# Patient Record
Sex: MALE | Race: ASIAN | NOT HISPANIC OR LATINO
[De-identification: names, ages, dates, MRNs, and addresses within clinical notes are randomized per-mention and may not be internally consistent; named-entity substitution may affect disease eponyms.]

---

## 2020-03-28 ENCOUNTER — TRANSCRIPTION ENCOUNTER (OUTPATIENT)
Age: 40
End: 2020-03-28

## 2020-09-04 ENCOUNTER — TRANSCRIPTION ENCOUNTER (OUTPATIENT)
Age: 40
End: 2020-09-04

## 2020-10-10 ENCOUNTER — EMERGENCY (EMERGENCY)
Facility: HOSPITAL | Age: 40
LOS: 0 days | Discharge: HOME | End: 2020-10-10
Payer: COMMERCIAL

## 2020-10-10 VITALS
RESPIRATION RATE: 17 BRPM | TEMPERATURE: 99 F | SYSTOLIC BLOOD PRESSURE: 140 MMHG | HEART RATE: 69 BPM | DIASTOLIC BLOOD PRESSURE: 95 MMHG | OXYGEN SATURATION: 100 %

## 2020-10-10 DIAGNOSIS — Z20.828 CONTACT WITH AND (SUSPECTED) EXPOSURE TO OTHER VIRAL COMMUNICABLE DISEASES: ICD-10-CM

## 2020-10-10 LAB — SARS-COV-2 RNA SPEC QL NAA+PROBE: SIGNIFICANT CHANGE UP

## 2020-10-10 PROCEDURE — U0003: CPT

## 2020-10-10 PROCEDURE — 99283 EMERGENCY DEPT VISIT LOW MDM: CPT

## 2020-10-10 NOTE — ED ADULT NURSE NOTE - OBJECTIVE STATEMENT
Patient evaluated, treated, and discharged by provider. Refer to provider note for assessment.  Patient presents for COVID swabbing. Evaluated in car by assigned RN and PA.

## 2020-10-10 NOTE — ED STATDOCS - NS ED ROS FT
ROS: Constitutional- DENIES fever, chills.  Respiratory- DENIES cough, SOB  Cardiac- no chest pain, no palpitations, ENT- DENIES rhinorrhea, no sore throat, no congestion. DENIES loss of sense of smell or taste. Abdomen- No nausea, no vomiting, no diarrhea.  Urinary- no dysuria, no urgency, no frequency.  Skin- No rashes ~Dick Arshad PA-C

## 2020-10-10 NOTE — ED STATDOCS - PROGRESS NOTE DETAILS
How to get your Coronavirus (COVID-19) Testing Results:   Please be advised that you were tested for the coronavirus (COVID-19) in the Emergency Department at [facility].  You are to maintain self-quarantine procedures for 14 days until instructed otherwise by one of our healthcare agents. Please note that the test may take up to 2-4 days to result.  If you do not hear from us within 72 hours and you'd like to check on your results, you can call on of our coronavirus specialists at [address] [place] [address] (available 24/7).  Please DO NOT call the site where you received the test to obtain your results. ~Dick Arshad PA-C

## 2020-10-10 NOTE — ED STATDOCS - CLINICAL SUMMARY MEDICAL DECISION MAKING FREE TEXT BOX
Patient presents with concerns for COVID exposure.  As patient is nontoxic appearing, will test for COVID and d/c.  Quarantine reviewed and return precautions reviewed. ~Dick Arshad PA-C

## 2020-10-10 NOTE — ED STATDOCS - PHYSICAL EXAMINATION
PA note: Constitutional: NAD AAOx3  Eyes: EOMI, pupils equal  Head: Normocephalic, atraumatic  ENT: Throat is clear without erythema. No exudates. Airway is patent.  Mouth: no airway obstruction.   Cardiac: S1 S2. regular rate   Resp: Non-labored breathing, no tachypnea. Lungs CTA b/l. No w/r/r. Equal chest expansion and rise. O2sat 100% RA  GI: Abd soft. NT/ND.   Neuro: CN2-12 intact. No focal deficits.   Skin: No rashes  ~Dick Arshad PA-C

## 2020-10-10 NOTE — ED STATDOCS - PATIENT PORTAL LINK FT
You can access the FollowMyHealth Patient Portal offered by [facility] by registering at the following website: [email or web address]. By joining [facility]’s FollowMyHealth portal, you will also be able to view your health information using other applications (apps) compatible with our system.

## 2020-10-10 NOTE — ED ADULT TRIAGE NOTE - CHIEF COMPLAINT QUOTE
Patient presents for COVID-19 testing. Patient provides verbal consent to receive results via text or email  no symptoms, needs for emergency flight tonight 2* mother passing

## 2020-10-10 NOTE — ED STATDOCS - OBJECTIVE STATEMENT
Patient presents to [facility] for screening for COVID-19 for traveling purposes. Patient's mother passed away last night due to a "heart condition". Patient is taking his mother's body to [place] for burial and needs a NEG COVID test in order to travel. Patient is flying out tonight and requests an URGENT COVID test. Patient has no acute S&S of fever, chills, cough, SOB, ISBELL, n/v/d. No loss of sense of smell or taste. Patient may have been exposed to COVID-19. ~Dick Arshad PA-C.

## 2020-10-30 ENCOUNTER — TRANSCRIPTION ENCOUNTER (OUTPATIENT)
Age: 40
End: 2020-10-30

## 2020-11-25 ENCOUNTER — OUTPATIENT (OUTPATIENT)
Dept: OUTPATIENT SERVICES | Facility: HOSPITAL | Age: 40
LOS: 1 days | End: 2020-11-25
Payer: COMMERCIAL

## 2020-11-25 DIAGNOSIS — Z11.59 ENCOUNTER FOR SCREENING FOR OTHER VIRAL DISEASES: ICD-10-CM

## 2020-11-25 PROBLEM — Z78.9 OTHER SPECIFIED HEALTH STATUS: Chronic | Status: ACTIVE | Noted: 2020-10-10

## 2020-11-25 LAB — SARS-COV-2 RNA SPEC QL NAA+PROBE: SIGNIFICANT CHANGE UP

## 2020-11-25 PROCEDURE — U0003: CPT

## 2020-11-26 DIAGNOSIS — Z11.59 ENCOUNTER FOR SCREENING FOR OTHER VIRAL DISEASES: ICD-10-CM

## 2021-04-06 ENCOUNTER — TRANSCRIPTION ENCOUNTER (OUTPATIENT)
Age: 41
End: 2021-04-06

## 2021-04-13 ENCOUNTER — TRANSCRIPTION ENCOUNTER (OUTPATIENT)
Age: 41
End: 2021-04-13

## 2022-02-26 ENCOUNTER — TRANSCRIPTION ENCOUNTER (OUTPATIENT)
Age: 42
End: 2022-02-26

## 2022-10-28 ENCOUNTER — NON-APPOINTMENT (OUTPATIENT)
Age: 42
End: 2022-10-28

## 2022-11-05 ENCOUNTER — NON-APPOINTMENT (OUTPATIENT)
Age: 42
End: 2022-11-05

## 2023-10-06 ENCOUNTER — NON-APPOINTMENT (OUTPATIENT)
Age: 43
End: 2023-10-06

## 2023-10-17 PROBLEM — Z00.00 ENCOUNTER FOR PREVENTIVE HEALTH EXAMINATION: Status: ACTIVE | Noted: 2023-10-17

## 2023-11-30 ENCOUNTER — NON-APPOINTMENT (OUTPATIENT)
Age: 43
End: 2023-11-30

## 2023-11-30 ENCOUNTER — APPOINTMENT (OUTPATIENT)
Dept: DERMATOLOGY | Facility: CLINIC | Age: 43
End: 2023-11-30
Payer: COMMERCIAL

## 2023-11-30 PROCEDURE — 17110 DESTRUCTION B9 LES UP TO 14: CPT

## 2023-11-30 PROCEDURE — 99204 OFFICE O/P NEW MOD 45 MIN: CPT | Mod: 25

## 2023-11-30 RX ORDER — KETOCONAZOLE 20 MG/G
2 CREAM TOPICAL
Qty: 60 | Refills: 2 | Status: ACTIVE | COMMUNITY
Start: 2023-11-30 | End: 1900-01-01

## 2023-12-25 ENCOUNTER — NON-APPOINTMENT (OUTPATIENT)
Age: 43
End: 2023-12-25

## 2024-01-19 ENCOUNTER — APPOINTMENT (OUTPATIENT)
Dept: DERMATOLOGY | Facility: CLINIC | Age: 44
End: 2024-01-19
Payer: COMMERCIAL

## 2024-01-19 VITALS — WEIGHT: 262 LBS | BODY MASS INDEX: 36.68 KG/M2 | HEIGHT: 71 IN

## 2024-01-19 DIAGNOSIS — L30.9 DERMATITIS, UNSPECIFIED: ICD-10-CM

## 2024-01-19 DIAGNOSIS — B35.3 TINEA PEDIS: ICD-10-CM

## 2024-01-19 DIAGNOSIS — B35.1 TINEA UNGUIUM: ICD-10-CM

## 2024-01-19 DIAGNOSIS — B07.9 VIRAL WART, UNSPECIFIED: ICD-10-CM

## 2024-01-19 PROCEDURE — 99214 OFFICE O/P EST MOD 30 MIN: CPT | Mod: 25

## 2024-01-19 PROCEDURE — 17110 DESTRUCTION B9 LES UP TO 14: CPT

## 2024-01-19 RX ORDER — CICLOPIROX 80 MG/ML
8 SOLUTION TOPICAL
Qty: 1 | Refills: 5 | Status: ACTIVE | COMMUNITY
Start: 2023-11-30 | End: 1900-01-01

## 2024-01-19 RX ORDER — HYDROCORTISONE 25 MG/G
2.5 OINTMENT TOPICAL
Qty: 1 | Refills: 0 | Status: ACTIVE | COMMUNITY
Start: 2024-01-19 | End: 1900-01-01

## 2024-01-19 NOTE — HISTORY OF PRESENT ILLNESS
[FreeTextEntry1] : Tinea pedis/onycho f/u [de-identified] : Patient here for follow-up of tinea pedis, onychomycosis and verruca. Patient has been using keto cream to feet with improvement but was unable to get ciclopirox solution. Verruca on neck treated with cryotherapy with no improvement.  Patient also c/o dryness under his left eye. He denies any new personal care products.

## 2024-01-19 NOTE — PHYSICAL EXAM
[Alert] : alert [Oriented x 3] : ~L oriented x 3 [Well Nourished] : well nourished [FreeTextEntry3] : verrucous and flat-topped flesh-colored to pink papules on neck  mild scaling of bilateral feet  thickened discolored toenails   Mild flaking of left lower eyelid

## 2024-01-19 NOTE — ASSESSMENT
[FreeTextEntry1] : Tinea pedis: Continue Ketoconazole cream BID until resolved   Onychomycosis: Start Ciclopirox solution BID to affected toenails (sent to [place]) Discussed that nails can co-infect feet   Eczema/eyelid dermatitis: Hydrocortisone 2.5% ointment BID x 1 week Discussed risks and benefits of topical steroids, including atrophy, telangiectasias, striae, acneiform eruption, purpura, changes in pigmentation, and hypertrichosis.   Verruca vulgaris, on the neck We have discussed the nature and course of this condition. We have discussed treatment options and expectations from treatment Electrocautery to verruca x 9   Procedure was well tolerated without complication. Discussed wound care with vaseline. Advised patient to avoid shaving area until lesions resolve

## 2024-04-18 ENCOUNTER — APPOINTMENT (OUTPATIENT)
Dept: DERMATOLOGY | Facility: CLINIC | Age: 44
End: 2024-04-18

## 2024-05-15 ENCOUNTER — NON-APPOINTMENT (OUTPATIENT)
Age: 44
End: 2024-05-15

## 2024-08-24 ENCOUNTER — NON-APPOINTMENT (OUTPATIENT)
Age: 44
End: 2024-08-24

## 2024-09-19 ENCOUNTER — APPOINTMENT (OUTPATIENT)
Dept: OTOLARYNGOLOGY | Facility: CLINIC | Age: 44
End: 2024-09-19
Payer: COMMERCIAL

## 2024-09-19 VITALS
DIASTOLIC BLOOD PRESSURE: 85 MMHG | BODY MASS INDEX: 23.8 KG/M2 | WEIGHT: 170 LBS | SYSTOLIC BLOOD PRESSURE: 141 MMHG | HEIGHT: 71 IN | HEART RATE: 71 BPM

## 2024-09-19 DIAGNOSIS — Z86.39 PERSONAL HISTORY OF OTHER ENDOCRINE, NUTRITIONAL AND METABOLIC DISEASE: ICD-10-CM

## 2024-09-19 DIAGNOSIS — H90.3 SENSORINEURAL HEARING LOSS, BILATERAL: ICD-10-CM

## 2024-09-19 DIAGNOSIS — H61.23 IMPACTED CERUMEN, BILATERAL: ICD-10-CM

## 2024-09-19 DIAGNOSIS — Z78.9 OTHER SPECIFIED HEALTH STATUS: ICD-10-CM

## 2024-09-19 DIAGNOSIS — H69.91 UNSPECIFIED EUSTACHIAN TUBE DISORDER, RIGHT EAR: ICD-10-CM

## 2024-09-19 DIAGNOSIS — H69.92 UNSPECIFIED EUSTACHIAN TUBE DISORDER, LEFT EAR: ICD-10-CM

## 2024-09-19 DIAGNOSIS — Z86.69 PERSONAL HISTORY OF OTHER DISEASES OF THE NERVOUS SYSTEM AND SENSE ORGANS: ICD-10-CM

## 2024-09-19 PROCEDURE — G0268 REMOVAL OF IMPACTED WAX MD: CPT

## 2024-09-19 PROCEDURE — 92557 COMPREHENSIVE HEARING TEST: CPT

## 2024-09-19 PROCEDURE — 99204 OFFICE O/P NEW MOD 45 MIN: CPT | Mod: 25

## 2024-09-19 PROCEDURE — 92567 TYMPANOMETRY: CPT

## 2024-09-19 RX ORDER — FLUTICASONE PROPIONATE 50 UG/1
50 SPRAY, METERED NASAL DAILY
Qty: 1 | Refills: 2 | Status: ACTIVE | COMMUNITY
Start: 2024-09-19 | End: 1900-01-01

## 2024-09-19 RX ORDER — LEVOTHYROXINE SODIUM 0.17 MG/1
TABLET ORAL
Refills: 0 | Status: ACTIVE | COMMUNITY

## 2024-09-19 NOTE — PHYSICAL EXAM
[Midline] : trachea located in midline position [Normal] : temporomandibular joint is normal [de-identified] : bilat cerumen  [de-identified] : after removal

## 2024-09-19 NOTE — HISTORY OF PRESENT ILLNESS
[de-identified] : 44 year old male presents for right ruptured ear drum for past 1 month when patient was landing on flight- he started to feel pain.  Told of perf at Urgent Care.  Urgent care visit 8/25/24- started Ofloxacin: mild relief. Now, intermittent otalgia. Referred to ENT. PCP stated left ear clogging. Patient denies otorrhea,  hearing loss, tinnitus, dizziness, vertigo related to hearing.  Still occ discomfort right ear.   No prior ear problems

## 2024-09-19 NOTE — ASSESSMENT
[FreeTextEntry1] : Patient with right ear discomfort after airplane flight one month ago.  Treated at  with ear drops and told of perf.  No hx of drainage. Now improved.  Exam today - NO perforation noted.  Ears clear and audio and tymp normal.  Feel likely ETD - recommended flonase and followup as needed.  Feel likely either healed perf or more likely no perforation and ETD after flying.  Cerumen was removed from both ears.

## 2025-01-25 ENCOUNTER — RX RENEWAL (OUTPATIENT)
Age: 45
End: 2025-01-25

## 2025-08-19 ENCOUNTER — EMERGENCY (EMERGENCY)
Facility: HOSPITAL | Age: 45
LOS: 0 days | Discharge: HOME | End: 2025-08-19
Attending: EMERGENCY MEDICINE
Payer: COMMERCIAL

## 2025-08-19 VITALS
DIASTOLIC BLOOD PRESSURE: 82 MMHG | WEIGHT: 270.51 LBS | RESPIRATION RATE: 19 BRPM | HEART RATE: 67 BPM | TEMPERATURE: 99 F | OXYGEN SATURATION: 100 % | SYSTOLIC BLOOD PRESSURE: 134 MMHG

## 2025-08-19 DIAGNOSIS — L29.9 PRURITUS, UNSPECIFIED: ICD-10-CM

## 2025-08-19 PROCEDURE — 99283 EMERGENCY DEPT VISIT LOW MDM: CPT

## 2025-08-19 PROCEDURE — 99282 EMERGENCY DEPT VISIT SF MDM: CPT
